# Patient Record
Sex: MALE | Race: WHITE | NOT HISPANIC OR LATINO | URBAN - METROPOLITAN AREA
[De-identification: names, ages, dates, MRNs, and addresses within clinical notes are randomized per-mention and may not be internally consistent; named-entity substitution may affect disease eponyms.]

---

## 2022-12-24 ENCOUNTER — EMERGENCY (EMERGENCY)
Facility: HOSPITAL | Age: 42
LOS: 1 days | Discharge: HOME | End: 2022-12-24
Attending: EMERGENCY MEDICINE | Admitting: EMERGENCY MEDICINE

## 2022-12-24 VITALS
SYSTOLIC BLOOD PRESSURE: 121 MMHG | OXYGEN SATURATION: 96 % | TEMPERATURE: 98 F | DIASTOLIC BLOOD PRESSURE: 74 MMHG | RESPIRATION RATE: 17 BRPM | HEART RATE: 80 BPM

## 2022-12-24 VITALS
RESPIRATION RATE: 18 BRPM | HEART RATE: 78 BPM | OXYGEN SATURATION: 95 % | TEMPERATURE: 98 F | HEIGHT: 70.87 IN | SYSTOLIC BLOOD PRESSURE: 124 MMHG | DIASTOLIC BLOOD PRESSURE: 78 MMHG | WEIGHT: 264.55 LBS

## 2022-12-24 LAB — ETHANOL SERPL-MCNC: 405 MG/DL — HIGH

## 2022-12-24 PROCEDURE — 99284 EMERGENCY DEPT VISIT MOD MDM: CPT

## 2022-12-24 NOTE — ED ADULT NURSE NOTE - OBJECTIVE STATEMENT
Male patient BIBEMS from [facility], found outside sitting on the floor unable to stand up. Denies fall, denies pain. pending sobriety. alert and verbal. safety precautions in place and bed alarms on

## 2022-12-24 NOTE — ED PROVIDER NOTE - OBJECTIVE STATEMENT
Course 42-year-old man brought in by EMS from a bar.  They were called because he was unable to ambulate.  His he admits to drinking" 1 drink and denies other drug use.  He appears clinically intoxicated.  He denies any acute medical symptoms such as chest pain shortness of breath fevers or injuries or pain.  He is polite and cooperative and says that he is visiting from [place] and renting an apartment.  He has his wallet on and he is with them according to him.

## 2022-12-24 NOTE — ED PROVIDER NOTE - PHYSICAL EXAMINATION
Const: AMS- appears intox, good hygiene  ENT: Airway patent, protecting airway. Nasal mucosa clear. MMM. No scalp hematoma and no apparent c-spine tenderness.  Eyes: Clear bilaterally, pupils equal, round 3mm  Cardiac: Normal rate, regular rhythm.  Heart sounds S1, S2.  No murmurs, rubs or gallops.  Resp: Breath sounds clear and equal bilaterally.  GI: Abdomen soft, appears non-tender, no guarding.  MSK: No signs of acute trauma or injury.   Neuro: Awake, alert, normal tone, GÓMEZ, answers all questions, slight slurred speech  Skin: No signs of acute trauma  Psych: AMS- appears intox, polite and cooperative

## 2022-12-24 NOTE — ED PROVIDER NOTE - PATIENT PORTAL LINK FT
You can access the FollowMyHealth Patient Portal offered by [facility] by registering at the following website: [email or web address]. By joining [employer]’s FollowMyHealth portal, you will also be able to view your health information using other applications (apps) compatible with our system.

## 2022-12-24 NOTE — ED ADULT NURSE NOTE - NSIMPLEMENTINTERV_GEN_ALL_ED
Implemented All Fall Risk Interventions:  [place] to call system. Call bell, personal items and telephone within reach. Instruct patient to call for assistance. Room bathroom lighting operational. Non-slip footwear when patient is off stretcher. Physically safe environment: no spills, clutter or unnecessary equipment. Stretcher in lowest position, wheels locked, appropriate side rails in place. Provide visual cue, wrist band, yellow gown, etc. Monitor gait and stability. Monitor for mental status changes and reorient to person, place, and time. Review medications for side effects contributing to fall risk. Reinforce activity limits and safety measures with patient and family.

## 2022-12-24 NOTE — ED PROVIDER NOTE - PROGRESS NOTE DETAILS
EtOH 405, patient requesting to leave, he will take a cab to his apt on [address]. Gait is steady. Suspect underlying EtOH use given degree of functioning at such a high EtOH level.

## 2022-12-26 DIAGNOSIS — F10.920 ALCOHOL USE, UNSPECIFIED WITH INTOXICATION, UNCOMPLICATED: ICD-10-CM

## 2022-12-26 DIAGNOSIS — R41.82 ALTERED MENTAL STATUS, UNSPECIFIED: ICD-10-CM
